# Patient Record
Sex: MALE | Race: WHITE | Employment: STUDENT | URBAN - METROPOLITAN AREA
[De-identification: names, ages, dates, MRNs, and addresses within clinical notes are randomized per-mention and may not be internally consistent; named-entity substitution may affect disease eponyms.]

---

## 2017-07-15 ENCOUNTER — HOSPITAL ENCOUNTER (EMERGENCY)
Facility: CLINIC | Age: 32
Discharge: HOME | End: 2017-07-15
Attending: EMERGENCY MEDICINE | Admitting: EMERGENCY MEDICINE
Payer: COMMERCIAL

## 2017-07-15 VITALS
OXYGEN SATURATION: 98 % | SYSTOLIC BLOOD PRESSURE: 108 MMHG | DIASTOLIC BLOOD PRESSURE: 51 MMHG | HEIGHT: 72 IN | WEIGHT: 198 LBS | HEART RATE: 59 BPM | BODY MASS INDEX: 26.82 KG/M2 | RESPIRATION RATE: 18 BRPM | TEMPERATURE: 97.5 F

## 2017-07-15 DIAGNOSIS — R79.89 ELEVATED SERUM CREATININE: ICD-10-CM

## 2017-07-15 DIAGNOSIS — E86.0 DEHYDRATION: ICD-10-CM

## 2017-07-15 DIAGNOSIS — T67.5XXA HEAT EXHAUSTION, INITIAL ENCOUNTER: ICD-10-CM

## 2017-07-15 DIAGNOSIS — R74.8 ELEVATED CK: ICD-10-CM

## 2017-07-15 LAB
ALBUMIN SERPL-MCNC: 5.5 G/DL (ref 3.4–5)
ALP SERPL-CCNC: 103 U/L (ref 40–150)
ALT SERPL W P-5'-P-CCNC: 34 U/L (ref 0–70)
ANION GAP SERPL CALCULATED.3IONS-SCNC: 10 MMOL/L (ref 3–14)
ANION GAP SERPL CALCULATED.3IONS-SCNC: 14 MMOL/L (ref 3–14)
ANION GAP SERPL CALCULATED.3IONS-SCNC: 15 MMOL/L (ref 6–17)
AST SERPL W P-5'-P-CCNC: 29 U/L (ref 0–45)
BASOPHILS # BLD AUTO: 0.1 10E9/L (ref 0–0.2)
BASOPHILS NFR BLD AUTO: 0.3 %
BILIRUB SERPL-MCNC: 1.4 MG/DL (ref 0.2–1.3)
BUN SERPL-MCNC: 18 MG/DL (ref 5–24)
BUN SERPL-MCNC: 19 MG/DL (ref 7–30)
BUN SERPL-MCNC: 21 MG/DL (ref 7–30)
CA-I BLD-SCNC: 4.3 MG/DL (ref 4.4–5.2)
CALCIUM SERPL-MCNC: 11.2 MG/DL (ref 8.5–10.1)
CALCIUM SERPL-MCNC: 8.8 MG/DL (ref 8.5–10.1)
CHLORIDE BLD-SCNC: 109 MMOL/L (ref 94–109)
CHLORIDE SERPL-SCNC: 101 MMOL/L (ref 94–109)
CHLORIDE SERPL-SCNC: 109 MMOL/L (ref 94–109)
CK SERPL-CCNC: 329 U/L (ref 30–300)
CO2 BLD-SCNC: 18 MMOL/L (ref 20–32)
CO2 SERPL-SCNC: 21 MMOL/L (ref 20–32)
CO2 SERPL-SCNC: 23 MMOL/L (ref 20–32)
CREAT BLD-MCNC: 1.9 MG/DL (ref 0.66–1.25)
CREAT SERPL-MCNC: 2.26 MG/DL (ref 0.66–1.25)
CREAT SERPL-MCNC: 2.4 MG/DL (ref 0.66–1.25)
DIFFERENTIAL METHOD BLD: ABNORMAL
EOSINOPHIL # BLD AUTO: 0 10E9/L (ref 0–0.7)
EOSINOPHIL NFR BLD AUTO: 0 %
ERYTHROCYTE [DISTWIDTH] IN BLOOD BY AUTOMATED COUNT: 12.2 % (ref 10–15)
GFR SERPL CREATININE-BSD FRML MDRD: 31 ML/MIN/1.7M2
GFR SERPL CREATININE-BSD FRML MDRD: 33 ML/MIN/1.7M2
GFR SERPL CREATININE-BSD FRML MDRD: 41 ML/MIN/1.7M2
GLUCOSE BLD-MCNC: 82 MG/DL (ref 70–99)
GLUCOSE SERPL-MCNC: 112 MG/DL (ref 70–99)
GLUCOSE SERPL-MCNC: 83 MG/DL (ref 70–99)
HCT VFR BLD AUTO: 47 % (ref 40–53)
HCT VFR BLD CALC: 36 %PCV (ref 40–53)
HGB BLD CALC-MCNC: 12.2 G/DL (ref 13.3–17.7)
HGB BLD-MCNC: 16.5 G/DL (ref 13.3–17.7)
IMM GRANULOCYTES # BLD: 0.1 10E9/L (ref 0–0.4)
IMM GRANULOCYTES NFR BLD: 0.5 %
LACTATE BLD-SCNC: 1.6 MMOL/L (ref 0.7–2.1)
LACTATE BLD-SCNC: 4.1 MMOL/L (ref 0.7–2.1)
LYMPHOCYTES # BLD AUTO: 1.2 10E9/L (ref 0.8–5.3)
LYMPHOCYTES NFR BLD AUTO: 6.3 %
MCH RBC QN AUTO: 28.8 PG (ref 26.5–33)
MCHC RBC AUTO-ENTMCNC: 35.1 G/DL (ref 31.5–36.5)
MCV RBC AUTO: 82 FL (ref 78–100)
MONOCYTES # BLD AUTO: 1.4 10E9/L (ref 0–1.3)
MONOCYTES NFR BLD AUTO: 7.4 %
NEUTROPHILS # BLD AUTO: 15.8 10E9/L (ref 1.6–8.3)
NEUTROPHILS NFR BLD AUTO: 85.5 %
NRBC # BLD AUTO: 0 10*3/UL
NRBC BLD AUTO-RTO: 0 /100
PLATELET # BLD AUTO: 242 10E9/L (ref 150–450)
POTASSIUM BLD-SCNC: 3.8 MMOL/L (ref 3.4–5.3)
POTASSIUM SERPL-SCNC: 3.8 MMOL/L (ref 3.4–5.3)
POTASSIUM SERPL-SCNC: 5.8 MMOL/L (ref 3.4–5.3)
PROT SERPL-MCNC: 9.3 G/DL (ref 6.8–8.8)
RBC # BLD AUTO: 5.72 10E12/L (ref 4.4–5.9)
SODIUM BLD-SCNC: 142 MMOL/L (ref 133–144)
SODIUM SERPL-SCNC: 138 MMOL/L (ref 133–144)
SODIUM SERPL-SCNC: 140 MMOL/L (ref 133–144)
WBC # BLD AUTO: 18.5 10E9/L (ref 4–11)

## 2017-07-15 PROCEDURE — 80053 COMPREHEN METABOLIC PANEL: CPT | Performed by: EMERGENCY MEDICINE

## 2017-07-15 PROCEDURE — 25000128 H RX IP 250 OP 636: Performed by: PHYSICIAN ASSISTANT

## 2017-07-15 PROCEDURE — 82550 ASSAY OF CK (CPK): CPT | Performed by: EMERGENCY MEDICINE

## 2017-07-15 PROCEDURE — 85025 COMPLETE CBC W/AUTO DIFF WBC: CPT | Performed by: EMERGENCY MEDICINE

## 2017-07-15 PROCEDURE — 80048 BASIC METABOLIC PNL TOTAL CA: CPT | Performed by: EMERGENCY MEDICINE

## 2017-07-15 PROCEDURE — 25000128 H RX IP 250 OP 636: Performed by: EMERGENCY MEDICINE

## 2017-07-15 PROCEDURE — 96361 HYDRATE IV INFUSION ADD-ON: CPT

## 2017-07-15 PROCEDURE — 85014 HEMATOCRIT: CPT

## 2017-07-15 PROCEDURE — 99284 EMERGENCY DEPT VISIT MOD MDM: CPT | Mod: 25

## 2017-07-15 PROCEDURE — 96374 THER/PROPH/DIAG INJ IV PUSH: CPT

## 2017-07-15 PROCEDURE — 93005 ELECTROCARDIOGRAM TRACING: CPT

## 2017-07-15 PROCEDURE — 80047 BASIC METABLC PNL IONIZED CA: CPT

## 2017-07-15 PROCEDURE — 83605 ASSAY OF LACTIC ACID: CPT | Performed by: EMERGENCY MEDICINE

## 2017-07-15 RX ORDER — ONDANSETRON 4 MG/1
4 TABLET, ORALLY DISINTEGRATING ORAL EVERY 8 HOURS PRN
Qty: 10 TABLET | Refills: 0 | Status: SHIPPED | OUTPATIENT
Start: 2017-07-15 | End: 2017-07-18

## 2017-07-15 RX ORDER — SODIUM CHLORIDE 9 MG/ML
1000 INJECTION, SOLUTION INTRAVENOUS CONTINUOUS
Status: DISCONTINUED | OUTPATIENT
Start: 2017-07-15 | End: 2017-07-16 | Stop reason: HOSPADM

## 2017-07-15 RX ORDER — ONDANSETRON 2 MG/ML
4 INJECTION INTRAMUSCULAR; INTRAVENOUS EVERY 30 MIN PRN
Status: DISCONTINUED | OUTPATIENT
Start: 2017-07-15 | End: 2017-07-16 | Stop reason: HOSPADM

## 2017-07-15 RX ADMIN — SODIUM CHLORIDE 1000 ML: 9 INJECTION, SOLUTION INTRAVENOUS at 19:08

## 2017-07-15 RX ADMIN — SODIUM CHLORIDE 1000 ML: 9 INJECTION, SOLUTION INTRAVENOUS at 18:24

## 2017-07-15 RX ADMIN — ONDANSETRON 4 MG: 2 INJECTION INTRAMUSCULAR; INTRAVENOUS at 18:24

## 2017-07-15 ASSESSMENT — ENCOUNTER SYMPTOMS
NUMBNESS: 0
NAUSEA: 1
FEVER: 1
WEAKNESS: 0
ARTHRALGIAS: 1
VOMITING: 1
ABDOMINAL PAIN: 1
CHILLS: 1

## 2017-07-15 NOTE — ED PROVIDER NOTES
"  History     Chief Complaint:  Heat Exposure    HPI:  Konrad Amaya is a 31 year old, otherwise healthy male who presents for evaluation of possible heat exposure. The patient participated in the Tough Mudder race earlier today. About [facility] through, he states he became unusually exhausted and had to rest for approximately 20 minutes. After this, he then got a \"second wind\" and was able to complete the race. However, after finishing around 1500, he has been unable to keep food or fluids down due to persistent vomiting and nausea. Additionally, he complains of abdominal pain, cramping in his feet and legs, as well as alternating periods of feeling chilled and overheated. Of note, the patient states he has experienced these symptoms in the past after completing [employer] in [place], [place]. Additionally, the maximum outside temperature today was 97 degrees F and jaimee. He denies any recent illness. He denies numbness or weakness.     Allergies:  Sulfa Drugs     Medications:    The patient is not currently taking any prescribed medications.      Past Medical History:    Acne    Past Surgical History:    History reviewed. No pertinent surgical history.     Family History:    Heart Disease- Father  Hyperlipidemia- Father    Social History:  Smoking status: Never Smoker  Alcohol use: No   Marital Status:  Single      Review of Systems   Constitutional: Positive for chills and fever.   Gastrointestinal: Positive for abdominal pain, nausea and vomiting.   Musculoskeletal: Positive for arthralgias.   Neurological: Negative for weakness and numbness.   All other systems reviewed and are negative.      Physical Exam     Patient Vitals for the past 24 hrs:   BP Temp Temp src Pulse Heart Rate Resp SpO2 Height Weight   07/15/17 2100 (!) 138/93 - - - 54 - 99 % - -   07/15/17 2045 - - - - 53 - 98 % - -   07/15/17 2030 - - - - 51 - 100 % - -   07/15/17 2015 - - - - 49 - 100 % - -   07/15/17 2000 129/63 - - - 62 - 100 % - - "   07/15/17 1945 116/66 - - - 52 - 100 % - -   07/15/17 1930 126/82 - - - 51 - 100 % - -   07/15/17 1915 121/76 - - - 50 - 100 % - -   07/15/17 1900 135/75 - - - 46 - 98 % - -   07/15/17 1845 131/68 - - - 63 - 100 % - -   07/15/17 1838 - 97.5  F (36.4  C) Oral - - - - - -   07/15/17 1830 125/77 - - - 57 - 100 % - -   07/15/17 1815 127/87 - - - 52 - 100 % - -   07/15/17 1810 134/64 - Oral 59 59 18 100 % 1.829 m (6') 89.8 kg (198 lb)      Physical Exam:  General: Resting comfortably.  Alert and oriented. Flushed.  Head:  The scalp, face, and head appear normal   Eyes:  The pupils are equal, round, and reactive to light     Extraocular muscles are intact    Conjunctivae and sclerae are normal    CV:  Regular rate and rhythm     Normal S1/S2    No pathological murmur detected   Resp:  Lungs are clear to auscultation    Non-labored    No rales or wheezing   GI:  Abdomen is soft, non-distended    No rebound tenderness     Normal bowel sounds   MS:  Normal muscular tone   Skin:  No rash or acute skin lesions noted   Neuro: Speech is normal and fluent.     Emergency Department Course     ECG (18:42:13):  Rate 51 bpm. GA interval 220. QRS duration 102. QT/QTc 466/429. P-R-T axes 28- -5-30. Sinus bradycardia with 1st degree AV block. Otherwise normal ECG. Interpreted at 1850 by Aamir Erickson MD.     Laboratory:  1820: Lactic Acid: 4.1 (H)   2046: Lactic Acid: 1.6     1820: CMP: Glucose 112 (H), Creatinine 2.40 (H), GFR Estimate 31 (L), Calcium 11.2 (H), Bilirubin Total 1.4 (H), Albumin 5.5 (H), Protein Total 9.3 (H), o/w WNL   2046: Basic Metabolic Panel: Potassium 5.8 (H), Creatinine 2.26 (H), GFR 33 (L), o/w WNL     CK Total: 329 (H)   CBC: WBC 18.5 (H), Absolute Neutrophil 15.8 (H), Absolute Monocytes 1.4 (H), o/w WNL (HGB 16.5, )      2155: ISTAT Basic Metabolic: Total CO2 18 (L), Creatinine 1.9 (H), GFR Estimate 41 (L), Calcium Ionized 4.3 (L), HGB 12.2 (L), Hematocrit-POCT 36 (L), o/w  WNL    Interventions:  1824- Zofran 4 mg IV  1824- NS 1L IV Bolus   1908- NS 1L IV Bolus   NS bolus    Emergency Department Course:  Past medical records, nursing notes, and vitals reviewed.  1825: I performed an exam of the patient and obtained history, as documented above. GCS 15.     An ECG was obtained.    IV inserted and blood drawn.     The above interventions were administered.    2209: I rechecked the patient. Laboratory findings and plan explained to the Patient. Patient discharged home with instructions regarding supportive care, medications, and reasons to return. The importance of close follow-up was reviewed.      Impression & Plan    Medical Decision Making:  Konrad Amaya is a 31 year old, otherwise healthy male who presents for evaluation of heat exposure.  The patient presented vitally stable and afebrile.  Given his heat exposure and subsequent vomiting, febrile workup was initiated.  IV fluids were started.  Initial CBC demonstrated leukocytosis.  EKG unremarkable. CMP remarkable for an elevated creatinine to 2.4.  Lactic acid was 4.1.  CK was 329.  The patient was given a total of 3 L of normal saline.  After this, lactic acid was rechecked and was 1.6.  BMP was rechecked and creatinine was 2.26 and potassium was 5.8, but was hemolyzed.  An i-STAT was taken and the potassium was normal at 3.8 and creatinine was further improved to 1.9.  The patient was able to tolerate oral fluids after administration of zofran. Given these laboratory and symptomatic improvements, I think the patient can be safely discharged home at this time.  The patient looks and feels much improved and would like to go home at this time.  He was instructed to follow-up with a primary care doctor in 2-3 days to get his creatinine rechecked.  He is also instructed to push oral fluids and Gatorade and rest for the next few days.  He is asked to refrain from going outdoors in the heat.  He was given a prescription for Zofran for  nausea.  All questions were answered prior to discharge.  The patient understands and agrees with this plan.    Diagnosis:    ICD-10-CM   1. Heat exhaustion, initial encounter T67.5XXA   2. Elevated serum creatinine R79.89   3. Dehydration E86.0   4. Elevated CK R74.8     Disposition:  Discharged to home with Zofran.    Discharge Medications:  New Prescriptions    ONDANSETRON (ZOFRAN ODT) 4 MG ODT TAB    Take 1 tablet (4 mg) by mouth every 8 hours as needed for nausea     Carey Hernandez  7/15/2017   [facility] EMERGENCY DEPARTMENT    I, Carey Hernandez, divina serving as a scribe at 6:25 PM on 7/15/2017 to document services personally performed by Aamir Erickson MD based on my observations and the provider's statements to me.         Radha Lyles PA-C  07/15/17 2239      Emergency Department Attending Supervision Note  7/16/2017  3:26 AM      I evaluated this patient in conjunction with VINAY Duran      Briefly, the patient presented with nausea, vomiting, fatigue and profound exhaustion after completing the tough mudder event today in 90+ degree heat.    Overall he's economically stable and not febrile. Mentation and neurologic exam are normal. No evidence for heatstroke.    Labs reveal elevated creatinine had 2.4 consistent with acute in the kidney injury likely from dehydration, heat exhaustion. CPK is mildly elevated at 329, with upper limit of normal being 300. At this level of CK don't have much concern for Rhabdo.    He was hydrated with 3 L of saline. Received oral antiemetics and is now tolerating oral ice chips, that liquids without vomiting. Repeat creatinine is trending down to 1.9. Potassium still normal. ( 2nd specimen hemolyzed, 3rd specimen normal).    White blood cell count elevated. No fever of sign of infection. I suspect this is stress demargination.    Overall he's feeling better like to go home. Therefore will have an follow-up with his doctor on Monday for repeat  creatinine. Rest, avoid heat, push fluids. Return to the ER with progressive nausea vomiting, body aches weakness, decreased her changer and output, or any other concerns.        Diagnosis  (T67.5XXA) Heat exhaustion, initial encounter      (R79.89) Elevated serum creatinine      (E86.0) Dehydration      (R74.8) Elevated CK           Aamir Erickson MD  07/16/17 0332

## 2017-07-15 NOTE — ED AVS SNAPSHOT
[facility] Emergency Department    [address]    [facility] [address]    Phone:  [phone]    Fax:  [phone]                                       Konrad Amaya   MRN: [id number]    Department:  [facility] Emergency Department   Date of Visit:  7/15/2017           After Visit Summary Signature Page     I have received my discharge instructions, and my questions have been answered. I have discussed any challenges I see with this plan with the nurse or doctor.    ..........................................................................................................................................  Patient/Patient Representative Signature      ..........................................................................................................................................  Patient Representative Print Name and Relationship to Patient    ..................................................               ................................................  Date                                            Time    ..........................................................................................................................................  Reviewed by Signature/Title    ...................................................              ..............................................  Date                                                            Time

## 2017-07-15 NOTE — ED AVS SNAPSHOT
[facility] Emergency Department    [address] [facility] [address]    Phone:  [phone]    Fax:  [phone]                                       Konrad Amaya   MRN: [id number]    Department:  [facility] Emergency Department   Date of Visit:  7/15/2017           Patient Information     Date Of Birth          1985        Your diagnoses for this visit were:     Heat exhaustion, initial encounter     Elevated serum creatinine     Dehydration     Elevated CK        You were seen by Aamir Erickson MD.      Follow-up Information     Follow up with Jannie Lopez MD In 3 days.    Specialty:  Internal Medicine    Why:  creatinine and symptom recheck    Contact information:    [facility]  [address] [place] [address]  [phone]          Follow up with [facility] Emergency Department.    Specialty:  EMERGENCY MEDICINE    Why:  If symptoms worsen    Contact information:    [address] [place] [id number] [phone]        Discharge Instructions         You were seen today for heat exhaustion.  Your initial laboratory markers were elevated, but on recheck after rehydration these improved.  Please make an appointment to see a doctor in 2-3 days for recheck of creatinine.  Push oral fluids and Gatorade.  Rest for the next few days and stay out of the heat.  Zofran as needed for nausea. Please return for worsening symptoms.  Heat Exhaustion  Heat exhaustion is a condition that develops during prolonged exposure to heat. It is more likely to occur during strenuous activity, such as exercise or manual labor. Symptoms include a fast heartbeat, excess sweating, extreme tiredness, muscle cramps, headache, and weakness. They may also include stomach cramps, nausea, and vomiting. The person may be lightheaded and dizzy, and may even faint.  Treatment for heat exhaustion involves cooling the body down and  replacing lost fluids, electrolytes, and salts. Cooling may be done with fans, cold cloths, or a cold-water bath. Fluids are best replaced by drinking electrolyte solution, a sports drink, or water with 2 teaspoons of salt added for each 8 ounces. If a person is very dehydrated, confused, or unable to drink, IV (intravenous) fluids will likely be needed.  Heat exhaustion can progress to a serious condition called heatstroke, so it should be treated right away.  Home care  Continue to drink extra cold fluids at home during the next 12-24 hours. Water, electrolyte solution, or sports drinks are advised. Avoid alcohol and caffeine.  Preventing heat illness:    Protect yourself from the heat. Wear lightweight, light-colored clothing and a broad-brimmed hat.    Drink plenty of fluids before and during activity.    Limit exercise in hot or very humid weather. If you have to be active in the heat, take frequent breaks to drink fluids and cool down.    Avoid exercising when you are feeling ill.    Watch for symptoms of heat illness such as exhaustion, excess sweating, and lightheadedness. If any occur, move to a cool place, rest, and drink cool fluids. Lying down with your legs raised slightly can help you recover.    Avoid alcohol and caffeine.  Follow-up care  Follow up with your healthcare provider or this facility if symptoms do not improve within 1 hour.  When to seek medical advice  Call your healthcare provider right away for any of the following:    Inability to keep fluids down    Vomiting    Worsening symptoms or new symptoms  Call 911  Call 911 or emergency services right away for any of these symptoms of heatstroke:    Confusion    Irrational behavior    Hallucinations    Trouble walking    Seizures    Vomiting or diarrhea    Hot, flushed skin    Passing out    Fever of 104 F (40 C) or higher  Date Last Reviewed: 6/26/2015 2000-2017 [employer] [address] [place] [address], [place] [address]. All  rights reserved. This information is not intended as a substitute for professional medical care. Always follow your healthcare professional's instructions.          24 Hour Appointment Hotline       To make an appointment at any [facility], call [phone] ([phone]). If you don't have a family doctor or clinic, we will help you find one. [facility] clinics are conveniently located to serve the needs of you and your family.             Review of your medicines      START taking        Dose / Directions Last dose taken    ondansetron 4 MG ODT tab   Commonly known as:  ZOFRAN ODT   Dose:  4 mg   Quantity:  10 tablet        Take 1 tablet (4 mg) by mouth every 8 hours as needed for nausea   Refills:  0          Our records show that you are taking the medicines listed below. If these are incorrect, please call your family doctor or clinic.        Dose / Directions Last dose taken    NO ACTIVE MEDICATIONS        Refills:  0                Prescriptions were sent or printed at these locations (1 Prescription)                   Other Prescriptions                Printed at Department/Unit printer (1 of 1)         ondansetron (ZOFRAN ODT) 4 MG ODT tab                Procedures and tests performed during your visit     Procedure/Test Number of Times Performed    Basic metabolic panel 1    CBC with platelets differential 1    CK total 1    Comprehensive metabolic panel 1    EKG 12 lead 1    ISTAT Basic Met ICa HCT POCT 1    Lactic acid whole blood 2      Orders Needing Specimen Collection     None      Pending Results     Date and Time Order Name Status Description    7/15/2017 1836 EKG 12 lead Preliminary             Pending Culture Results     No orders found from 7/13/2017 to 7/16/2017.            Pending Results Instructions     If you had any lab results that were not finalized at the time of your Discharge, you can call the ED Lab Result RN at [phone]. You will be contacted by this team for any  positive Lab results or changes in treatment. The nurses are available 7 days a week from 10A to 6:30P.  You can leave a message 24 hours per day and they will return your call.        Test Results From Your Hospital Stay        7/15/2017  6:54 PM      Component Results     Component Value Ref Range & Units Status    Sodium 138 133 - 144 mmol/L Final    Potassium 3.8 3.4 - 5.3 mmol/L Final    Chloride 101 94 - 109 mmol/L Final    Carbon Dioxide 23 20 - 32 mmol/L Final    Anion Gap 14 3 - 14 mmol/L Final    Glucose 112 (H) 70 - 99 mg/dL Final    Urea Nitrogen 19 7 - 30 mg/dL Final    Creatinine 2.40 (H) 0.66 - 1.25 mg/dL Final    GFR Estimate 31 (L) >60 mL/min/1.7m2 Final    Non  GFR Calc    GFR Estimate If Black 38 (L) >60 mL/min/1.7m2 Final    African American GFR Calc    Calcium 11.2 (H) 8.5 - 10.1 mg/dL Final    Bilirubin Total 1.4 (H) 0.2 - 1.3 mg/dL Final    Albumin 5.5 (H) 3.4 - 5.0 g/dL Final    Protein Total 9.3 (H) 6.8 - 8.8 g/dL Final    Alkaline Phosphatase 103 40 - 150 U/L Final    ALT 34 0 - 70 U/L Final    AST 29 0 - 45 U/L Final         7/15/2017  6:35 PM      Component Results     Component Value Ref Range & Units Status    WBC 18.5 (H) 4.0 - 11.0 10e9/L Final    RBC Count 5.72 4.4 - 5.9 10e12/L Final    Hemoglobin 16.5 13.3 - 17.7 g/dL Final    Hematocrit 47.0 40.0 - 53.0 % Final    MCV 82 78 - 100 fl Final    MCH 28.8 26.5 - 33.0 pg Final    MCHC 35.1 31.5 - 36.5 g/dL Final    RDW 12.2 10.0 - 15.0 % Final    Platelet Count 242 150 - 450 10e9/L Final    Diff Method Automated Method  Final    % Neutrophils 85.5 % Final    % Lymphocytes 6.3 % Final    % Monocytes 7.4 % Final    % Eosinophils 0.0 % Final    % Basophils 0.3 % Final    % Immature Granulocytes 0.5 % Final    Nucleated RBCs 0 0 /100 Final    Absolute Neutrophil 15.8 (H) 1.6 - 8.3 10e9/L Final    Absolute Lymphocytes 1.2 0.8 - 5.3 10e9/L Final    Absolute Monocytes 1.4 (H) 0.0 - 1.3 10e9/L Final    Absolute Eosinophils 0.0  0.0 - 0.7 10e9/L Final    Absolute Basophils 0.1 0.0 - 0.2 10e9/L Final    Abs Immature Granulocytes 0.1 0 - 0.4 10e9/L Final    Absolute Nucleated RBC 0.0  Final         7/15/2017  7:03 PM      Component Results     Component Value Ref Range & Units Status    Lactic Acid 4.1 (HH) 0.7 - 2.1 mmol/L Final    Critical Value called to and read back by  PEPE BRICE IN [facility] [place] 07.15.17 AT 1900 BY VL           7/15/2017  7:31 PM      Component Results     Component Value Ref Range & Units Status    CK Total 329 (H) 30 - 300 U/L Final         7/15/2017  9:20 PM      Component Results     Component Value Ref Range & Units Status    Sodium 140 133 - 144 mmol/L Final    Potassium 5.8 (H) 3.4 - 5.3 mmol/L Final    Specimen slightly hemolyzed, potassium may be falsely elevated    Chloride 109 94 - 109 mmol/L Final    Carbon Dioxide 21 20 - 32 mmol/L Final    Anion Gap 10 3 - 14 mmol/L Final    Glucose 83 70 - 99 mg/dL Final    Urea Nitrogen 21 7 - 30 mg/dL Final    Creatinine 2.26 (H) 0.66 - 1.25 mg/dL Final    GFR Estimate 33 (L) >60 mL/min/1.7m2 Final    Non  GFR Calc    GFR Estimate If Black 40 (L) >60 mL/min/1.7m2 Final    African American GFR Calc    Calcium 8.8 8.5 - 10.1 mg/dL Final         7/15/2017  8:58 PM      Component Results     Component Value Ref Range & Units Status    Lactic Acid 1.6 0.7 - 2.1 mmol/L Final         7/15/2017 10:01 PM      Component Results     Component Value Ref Range & Units Status    Sodium 142 133 - 144 mmol/L Final    Potassium 3.8 3.4 - 5.3 mmol/L Final    Chloride 109 94 - 109 mmol/L Final    Total CO2 18 (L) 20 - 32 mmol/L Final    Anion Gap 15 6 - 17 mmol/L Final    Glucose 82 70 - 99 mg/dL Final    Urea Nitrogen 18 5 - 24 mg/dL Final    Creatinine 1.9 (H) 0.66 - 1.25 mg/dL Final    GFR Estimate 41 (L) >60 mL/min/1.7m2 Final    GFR Estimate If Black 50 (L) >60 mL/min/1.7m2 Final    Calcium Ionized 4.3 (L) 4.4 - 5.2 mg/dL Final    Hemoglobin 12.2 (L) 13.3 - 17.7 g/dL  Final    Hematocrit - POCT 36 (L) 40.0 - 53.0 %PCV Final                Clinical Quality Measure: Blood Pressure Screening     Your blood pressure was checked while you were in the emergency department today. The last reading we obtained was  BP: 108/51 . Please read the guidelines below about what these numbers mean and what you should do about them.  If your systolic blood pressure (the top number) is less than 120 and your diastolic blood pressure (the bottom number) is less than 80, then your blood pressure is normal. There is nothing more that you need to do about it.  If your systolic blood pressure (the top number) is 120-139 or your diastolic blood pressure (the bottom number) is 80-89, your blood pressure may be higher than it should be. You should have your blood pressure rechecked within a year by a primary care provider.  If your systolic blood pressure (the top number) is 140 or greater or your diastolic blood pressure (the bottom number) is 90 or greater, you may have high blood pressure. High blood pressure is treatable, but if left untreated over time it can put you at risk for heart attack, stroke, or kidney failure. You should have your blood pressure rechecked by a primary care provider within the next 4 weeks.  If your provider in the emergency department today gave you specific instructions to follow-up with your doctor or provider even sooner than that, you should follow that instruction and not wait for up to 4 weeks for your follow-up visit.        Thank you for choosing [facility]       Thank you for choosing [facility] for your care. Our goal is always to provide you with excellent care. Hearing back from our patients is one way we can continue to improve our services. Please take a few minutes to complete the written survey that you may receive in the mail after you visit with us. Thank you!        [employer] Information     [employer] lets you send messages to your doctor, view your test results,  "renew your prescriptions, schedule appointments and more. To sign up, go to [email or web address] . Click on \"Log in\" on the left side of the screen, which will take you to the Welcome page. Then click on \"Sign up Now\" on the right side of the page.     You will be asked to enter the access code listed below, as well as some personal information. Please follow the directions to create your username and password.     Your access code is: [email or web address]  Expires: 10/13/2017 10:10 PM     Your access code will  in 90 days. If you need help or a new code, please call your [facility] clinic or [phone].        Care EveryWhere ID     This is your Care EveryWhere ID. This could be used by other organizations to access your [facility] medical records  [id number]        Equal Access to Services     MELODY AGUILA : Mariann Dolan, lucio romano, pierre harrington, chano constantino . [facility] [phone].    ATENCIÓN: Si habla español, tiene a wood disposición servicios gratuitos de asistencia lingüística. Llame al [phone].    We comply with applicable federal civil rights laws and [place] laws. We do not discriminate on the basis of race, color, national origin, age, disability sex, sexual orientation or gender identity.            After Visit Summary       This is your record. Keep this with you and show to your community pharmacist(s) and doctor(s) at your next visit.                  "

## 2017-07-15 NOTE — ED NOTES
Emesis x 6 since 1500, unable to keep food or fluids down. C/o abdominal pain, feels hot and cold, cramping in feet and legs. Pt has had similar episodes in the past. Pt completed the tough mudder today. ABCD's intact.

## 2017-07-16 NOTE — DISCHARGE INSTRUCTIONS
You were seen today for heat exhaustion.  Your initial laboratory markers were elevated, but on recheck after rehydration these improved.  Please make an appointment to see a doctor in 2-3 days for recheck of creatinine.  Push oral fluids and Gatorade.  Rest for the next few days and stay out of the heat.  Zofran as needed for nausea. Please return for worsening symptoms.  Heat Exhaustion  Heat exhaustion is a condition that develops during prolonged exposure to heat. It is more likely to occur during strenuous activity, such as exercise or manual labor. Symptoms include a fast heartbeat, excess sweating, extreme tiredness, muscle cramps, headache, and weakness. They may also include stomach cramps, nausea, and vomiting. The person may be lightheaded and dizzy, and may even faint.  Treatment for heat exhaustion involves cooling the body down and replacing lost fluids, electrolytes, and salts. Cooling may be done with fans, cold cloths, or a cold-water bath. Fluids are best replaced by drinking electrolyte solution, a sports drink, or water with 2 teaspoons of salt added for each 8 ounces. If a person is very dehydrated, confused, or unable to drink, IV (intravenous) fluids will likely be needed.  Heat exhaustion can progress to a serious condition called heatstroke, so it should be treated right away.  Home care  Continue to drink extra cold fluids at home during the next 12-24 hours. Water, electrolyte solution, or sports drinks are advised. Avoid alcohol and caffeine.  Preventing heat illness:    Protect yourself from the heat. Wear lightweight, light-colored clothing and a broad-brimmed hat.    Drink plenty of fluids before and during activity.    Limit exercise in hot or very humid weather. If you have to be active in the heat, take frequent breaks to drink fluids and cool down.    Avoid exercising when you are feeling ill.    Watch for symptoms of heat illness such as exhaustion, excess sweating, and  lightheadedness. If any occur, move to a cool place, rest, and drink cool fluids. Lying down with your legs raised slightly can help you recover.    Avoid alcohol and caffeine.  Follow-up care  Follow up with your healthcare provider or this facility if symptoms do not improve within 1 hour.  When to seek medical advice  Call your healthcare provider right away for any of the following:    Inability to keep fluids down    Vomiting    Worsening symptoms or new symptoms  Call 911  Call 911 or emergency services right away for any of these symptoms of heatstroke:    Confusion    Irrational behavior    Hallucinations    Trouble walking    Seizures    Vomiting or diarrhea    Hot, flushed skin    Passing out    Fever of 104 F (40 C) or higher  Date Last Reviewed: 6/26/2015 2000-2017 [email or web address]. [address] [place] [address], [place] [address]. All rights reserved. This information is not intended as a substitute for professional medical care. Always follow your healthcare professional's instructions.

## 2017-07-17 LAB — INTERPRETATION ECG - MUSE: NORMAL
